# Patient Record
Sex: FEMALE | Race: WHITE | NOT HISPANIC OR LATINO | URBAN - METROPOLITAN AREA
[De-identification: names, ages, dates, MRNs, and addresses within clinical notes are randomized per-mention and may not be internally consistent; named-entity substitution may affect disease eponyms.]

---

## 2018-06-06 LAB
BILIRUB SERPL-MCNC: NEGATIVE MG/DL
BLOOD URINE, POC: NEGATIVE
CLARITY, POC UA: CLEAR
COLOR, POC UA: YELLOW
GLUCOSE UR QL STRIP: NEGATIVE
KETONES UR QL STRIP: NEGATIVE
LEUKOCYTE EST, POC UA: NEGATIVE
NITRITE, POC UA: NEGATIVE
PH, POC UA: 6
PROTEIN, POC: NEGATIVE
SPECIFIC GRAVITY, POC UA: 1.02
UROBILINOGEN, POC UA: NORMAL

## 2022-04-11 ENCOUNTER — HISTORICAL (OUTPATIENT)
Dept: ADMINISTRATIVE | Facility: HOSPITAL | Age: 42
End: 2022-04-11

## 2022-04-26 VITALS
WEIGHT: 151.88 LBS | BODY MASS INDEX: 26.91 KG/M2 | DIASTOLIC BLOOD PRESSURE: 81 MMHG | OXYGEN SATURATION: 99 % | HEIGHT: 63 IN | SYSTOLIC BLOOD PRESSURE: 131 MMHG

## 2022-09-22 ENCOUNTER — HISTORICAL (OUTPATIENT)
Dept: ADMINISTRATIVE | Facility: HOSPITAL | Age: 42
End: 2022-09-22

## 2023-01-14 ENCOUNTER — HOSPITAL ENCOUNTER (EMERGENCY)
Facility: HOSPITAL | Age: 43
Discharge: HOME | End: 2023-01-15
Attending: EMERGENCY MEDICINE
Payer: MEDICAID

## 2023-01-14 DIAGNOSIS — M79.672 FOOT PAIN, LEFT: ICD-10-CM

## 2023-01-14 DIAGNOSIS — S90.32XA CONTUSION OF LEFT FOOT, INITIAL ENCOUNTER: Primary | ICD-10-CM

## 2023-01-14 PROCEDURE — 99283 EMERGENCY DEPT VISIT LOW MDM: CPT | Mod: 25

## 2023-01-14 NOTE — Clinical Note
"Gail Delgadoson" Harjit was seen and treated in our emergency department on 1/14/2023.  She may return to work on 01/18/2023.       If you have any questions or concerns, please don't hesitate to call.      Yadi Hernandez MD"

## 2023-01-15 VITALS
RESPIRATION RATE: 16 BRPM | DIASTOLIC BLOOD PRESSURE: 85 MMHG | HEIGHT: 63 IN | WEIGHT: 170 LBS | BODY MASS INDEX: 30.12 KG/M2 | SYSTOLIC BLOOD PRESSURE: 117 MMHG | OXYGEN SATURATION: 100 % | HEART RATE: 94 BPM | TEMPERATURE: 98 F

## 2023-01-15 PROCEDURE — 25000003 PHARM REV CODE 250: Performed by: EMERGENCY MEDICINE

## 2023-01-15 RX ORDER — HYDROCODONE BITARTRATE AND ACETAMINOPHEN 5; 325 MG/1; MG/1
1 TABLET ORAL EVERY 6 HOURS PRN
Qty: 12 TABLET | Refills: 0 | Status: SHIPPED | OUTPATIENT
Start: 2023-01-15

## 2023-01-15 RX ORDER — HYDROCODONE BITARTRATE AND ACETAMINOPHEN 5; 325 MG/1; MG/1
1 TABLET ORAL
Status: COMPLETED | OUTPATIENT
Start: 2023-01-15 | End: 2023-01-15

## 2023-01-15 RX ADMIN — HYDROCODONE BITARTRATE AND ACETAMINOPHEN 1 TABLET: 5; 325 TABLET ORAL at 12:01
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
## 2025-07-22 ENCOUNTER — HOSPITAL ENCOUNTER (OUTPATIENT)
Dept: RADIOLOGY | Facility: HOSPITAL | Age: 45
Discharge: HOME | End: 2025-07-22
Attending: UROLOGY
Payer: COMMERCIAL

## 2025-07-22 ENCOUNTER — ANESTHESIA EVENT (OUTPATIENT)
Dept: SURGERY | Facility: HOSPITAL | Age: 45
End: 2025-07-22
Payer: COMMERCIAL

## 2025-07-22 DIAGNOSIS — N32.81 OVERACTIVE BLADDER: ICD-10-CM

## 2025-07-22 PROCEDURE — 71046 X-RAY EXAM CHEST 2 VIEWS: CPT | Mod: TC

## 2025-07-28 NOTE — PRE-PROCEDURE INSTRUCTIONS
"[facility]: Outpatient Surgery  Preprocedure Check-In Instructions     Your arrival time for your surgery or procedure is __1030am____.      Expectations: "Because of inconsistent procedure completion times, an unexpected wait may occur. The Physicians would like you to be here to prepare in the event they run ahead of time. We will make you as comfortable as possible and keep you informed. We apologize in advance if this happens."    You will arrive at [facility], [address] [place], [place].  Enter through the [address] entrance next to the Emergency Room, and come to the 6th floor to the Outpatient Surgery Department.     Visitory Policy:  You are allowed 2 adult visitors to be with you in the hospital. All hospital visitors should be in good current health.  No small children. No switching out of visitors while you are waiting to go to surgery, and while you are in surgery.      What to Bring:  Please have your ID, insurance cards, and all home medication bottles with you at check in.  Bring your CPAP machine if one is used at home.     Fasting:  Nothing to eat after midnight the night before your procedure. This includes no gum and/or tobacco products. You may have clear liquids limited to only: WATER, GATORADE, POWERADE and children can also have PEDIALYTE AND/OR APPLE JUICE , up until 2 hours before your arrival time.   Follow your doctor's instructions for taking any medications on the morning of your procedure.  If no instructions for taking medications were given, do not take any medications but bring your medications in their bottles to your procedure check in.     Follow your doctor's preoperative instructions regarding skin prep, bowel prep, bathing, or showering prior to your procedure.  If any special soaps were provided to you, please use according to your doctor's instructions. If no instructions were given from your doctor, take a good bath or shower with " antibacterial soap the night before and the morning of your procedure.  On the morning of procedure, wear loose, comfortable clothing.  No lotions, makeup, perfumes, colognes, deodorant, or jewelry to your procedure.  Removable items (glasses, contact lenses, dentures, retainers, hearing aids) need to be removed for your procedure.  Bring your storage containers for these items if you wear them.     Artificial nails, body jewelry, eyelash extensions, and/or hair extensions with metal clips are not allowed during your surgery.  If you currently wear any of these items, please arrange for them to be removed prior to your arrival to the hospital.     Outpatient or Same Day Surgeries:  Any patients receiving sedation/anesthesia are advised not to drive for 24 hours after their procedure.  We do not allow patients to drive themselves home after discharge.  If you are going home after your procedure, please have someone available to drive you home from the hospital.        You may call the Outpatient Surgery Department at [phone] with any questions or concerns.  We are looking forward to meeting you and taking great care of you for your procedure.  Thank you for choosing [facility] for your surgical needs

## 2025-07-29 ENCOUNTER — ANESTHESIA (OUTPATIENT)
Dept: SURGERY | Facility: HOSPITAL | Age: 45
End: 2025-07-29
Payer: COMMERCIAL

## 2025-07-29 ENCOUNTER — HOSPITAL ENCOUNTER (OUTPATIENT)
Facility: HOSPITAL | Age: 45
Discharge: HOME | End: 2025-07-29
Attending: UROLOGY | Admitting: UROLOGY
Payer: COMMERCIAL

## 2025-07-29 VITALS
WEIGHT: 167.56 LBS | RESPIRATION RATE: 19 BRPM | OXYGEN SATURATION: 98 % | SYSTOLIC BLOOD PRESSURE: 107 MMHG | BODY MASS INDEX: 29.69 KG/M2 | HEART RATE: 81 BPM | HEIGHT: 63 IN | DIASTOLIC BLOOD PRESSURE: 74 MMHG | TEMPERATURE: 98 F

## 2025-07-29 DIAGNOSIS — N39.3 STRESS INCONTINENCE OF URINE: Primary | ICD-10-CM

## 2025-07-29 PROBLEM — R32 URINARY INCONTINENCE: Status: ACTIVE | Noted: 2025-07-29

## 2025-07-29 PROBLEM — R32 URINARY INCONTINENCE: Status: RESOLVED | Noted: 2025-07-29 | Resolved: 2025-07-29

## 2025-07-29 PROCEDURE — 71000039 HC RECOVERY, EACH ADD'L HOUR: Performed by: UROLOGY

## 2025-07-29 PROCEDURE — 36000706: Performed by: UROLOGY

## 2025-07-29 PROCEDURE — 63600175 PHARM REV CODE 636 W HCPCS: Performed by: ANESTHESIOLOGY

## 2025-07-29 PROCEDURE — 25000003 PHARM REV CODE 250: Performed by: UROLOGY

## 2025-07-29 PROCEDURE — 63600175 PHARM REV CODE 636 W HCPCS: Performed by: STUDENT IN AN ORGANIZED HEALTH CARE EDUCATION/TRAINING PROGRAM

## 2025-07-29 PROCEDURE — 63600175 PHARM REV CODE 636 W HCPCS

## 2025-07-29 PROCEDURE — 37000009 HC ANESTHESIA EA ADD 15 MINS: Performed by: UROLOGY

## 2025-07-29 PROCEDURE — 71000015 HC POSTOP RECOV 1ST HR: Performed by: UROLOGY

## 2025-07-29 PROCEDURE — 36000707: Performed by: UROLOGY

## 2025-07-29 PROCEDURE — 25000003 PHARM REV CODE 250: Performed by: STUDENT IN AN ORGANIZED HEALTH CARE EDUCATION/TRAINING PROGRAM

## 2025-07-29 PROCEDURE — 71000016 HC POSTOP RECOV ADDL HR: Performed by: UROLOGY

## 2025-07-29 PROCEDURE — 37000008 HC ANESTHESIA 1ST 15 MINUTES: Performed by: UROLOGY

## 2025-07-29 PROCEDURE — 63600175 PHARM REV CODE 636 W HCPCS: Performed by: UROLOGY

## 2025-07-29 PROCEDURE — 71000033 HC RECOVERY, INTIAL HOUR: Performed by: UROLOGY

## 2025-07-29 PROCEDURE — C1771 REP DEV, URINARY, W/SLING: HCPCS | Performed by: UROLOGY

## 2025-07-29 PROCEDURE — 25000003 PHARM REV CODE 250

## 2025-07-29 DEVICE — SUPRAPUBIC MID-URETHRAL SLING
Type: IMPLANTABLE DEVICE | Site: BLADDER | Status: FUNCTIONAL
Brand: LYNX™ SYSTEM

## 2025-07-29 RX ORDER — MIDAZOLAM HYDROCHLORIDE 2 MG/2ML
2 INJECTION, SOLUTION INTRAMUSCULAR; INTRAVENOUS ONCE AS NEEDED
Status: DISCONTINUED | OUTPATIENT
Start: 2025-07-29 | End: 2025-07-29 | Stop reason: HOSPADM

## 2025-07-29 RX ORDER — METHOCARBAMOL 100 MG/ML
1000 INJECTION, SOLUTION INTRAMUSCULAR; INTRAVENOUS ONCE AS NEEDED
Status: DISCONTINUED | OUTPATIENT
Start: 2025-07-29 | End: 2025-07-29 | Stop reason: HOSPADM

## 2025-07-29 RX ORDER — SODIUM CHLORIDE, SODIUM GLUCONATE, SODIUM ACETATE, POTASSIUM CHLORIDE AND MAGNESIUM CHLORIDE 30; 37; 368; 526; 502 MG/100ML; MG/100ML; MG/100ML; MG/100ML; MG/100ML
INJECTION, SOLUTION INTRAVENOUS CONTINUOUS
Status: DISCONTINUED | OUTPATIENT
Start: 2025-07-29 | End: 2025-07-29 | Stop reason: HOSPADM

## 2025-07-29 RX ORDER — DIPHENHYDRAMINE HYDROCHLORIDE 50 MG/ML
INJECTION, SOLUTION INTRAMUSCULAR; INTRAVENOUS
Status: DISCONTINUED | OUTPATIENT
Start: 2025-07-29 | End: 2025-07-29

## 2025-07-29 RX ORDER — SODIUM CHLORIDE 9 MG/ML
INJECTION, SOLUTION INTRAVENOUS CONTINUOUS
Status: DISCONTINUED | OUTPATIENT
Start: 2025-07-29 | End: 2025-07-29 | Stop reason: HOSPADM

## 2025-07-29 RX ORDER — PROCHLORPERAZINE EDISYLATE 5 MG/ML
5 INJECTION INTRAMUSCULAR; INTRAVENOUS EVERY 30 MIN PRN
Status: DISCONTINUED | OUTPATIENT
Start: 2025-07-29 | End: 2025-07-29 | Stop reason: HOSPADM

## 2025-07-29 RX ORDER — ONDANSETRON HYDROCHLORIDE 2 MG/ML
INJECTION, SOLUTION INTRAVENOUS
Status: DISCONTINUED | OUTPATIENT
Start: 2025-07-29 | End: 2025-07-29

## 2025-07-29 RX ORDER — FENTANYL CITRATE 50 UG/ML
INJECTION, SOLUTION INTRAMUSCULAR; INTRAVENOUS
Status: DISCONTINUED | OUTPATIENT
Start: 2025-07-29 | End: 2025-07-29

## 2025-07-29 RX ORDER — LIDOCAINE HYDROCHLORIDE 20 MG/ML
INJECTION, SOLUTION EPIDURAL; INFILTRATION; INTRACAUDAL; PERINEURAL
Status: DISCONTINUED | OUTPATIENT
Start: 2025-07-29 | End: 2025-07-29

## 2025-07-29 RX ORDER — DROPERIDOL 2.5 MG/ML
0.62 INJECTION, SOLUTION INTRAMUSCULAR; INTRAVENOUS ONCE AS NEEDED
Status: DISCONTINUED | OUTPATIENT
Start: 2025-07-29 | End: 2025-07-29 | Stop reason: HOSPADM

## 2025-07-29 RX ORDER — LIDOCAINE HYDROCHLORIDE AND EPINEPHRINE 10; 10 UG/ML; MG/ML
INJECTION, SOLUTION INFILTRATION; PERINEURAL
Status: DISCONTINUED | OUTPATIENT
Start: 2025-07-29 | End: 2025-07-29 | Stop reason: HOSPADM

## 2025-07-29 RX ORDER — DEXAMETHASONE SODIUM PHOSPHATE 4 MG/ML
INJECTION, SOLUTION INTRA-ARTICULAR; INTRALESIONAL; INTRAMUSCULAR; INTRAVENOUS; SOFT TISSUE
Status: DISCONTINUED | OUTPATIENT
Start: 2025-07-29 | End: 2025-07-29

## 2025-07-29 RX ORDER — MIDAZOLAM HYDROCHLORIDE 1 MG/ML
INJECTION INTRAMUSCULAR; INTRAVENOUS
Status: DISCONTINUED | OUTPATIENT
Start: 2025-07-29 | End: 2025-07-29

## 2025-07-29 RX ORDER — ONDANSETRON HYDROCHLORIDE 2 MG/ML
INJECTION, SOLUTION INTRAVENOUS
Status: COMPLETED
Start: 2025-07-29 | End: 2025-07-29

## 2025-07-29 RX ORDER — ACETAMINOPHEN 10 MG/ML
INJECTION, SOLUTION INTRAVENOUS
Status: DISCONTINUED | OUTPATIENT
Start: 2025-07-29 | End: 2025-07-29

## 2025-07-29 RX ORDER — MEPERIDINE HYDROCHLORIDE 25 MG/ML
12.5 INJECTION INTRAMUSCULAR; INTRAVENOUS; SUBCUTANEOUS ONCE AS NEEDED
Status: DISCONTINUED | OUTPATIENT
Start: 2025-07-29 | End: 2025-07-29 | Stop reason: HOSPADM

## 2025-07-29 RX ORDER — SODIUM CHLORIDE, SODIUM LACTATE, POTASSIUM CHLORIDE, CALCIUM CHLORIDE 600; 310; 30; 20 MG/100ML; MG/100ML; MG/100ML; MG/100ML
INJECTION, SOLUTION INTRAVENOUS CONTINUOUS
Status: DISCONTINUED | OUTPATIENT
Start: 2025-07-29 | End: 2025-07-29 | Stop reason: HOSPADM

## 2025-07-29 RX ORDER — HYDROMORPHONE HYDROCHLORIDE 2 MG/ML
0.4 INJECTION, SOLUTION INTRAMUSCULAR; INTRAVENOUS; SUBCUTANEOUS EVERY 5 MIN PRN
Status: DISCONTINUED | OUTPATIENT
Start: 2025-07-29 | End: 2025-07-29 | Stop reason: HOSPADM

## 2025-07-29 RX ORDER — ROCURONIUM BROMIDE 10 MG/ML
INJECTION, SOLUTION INTRAVENOUS
Status: DISCONTINUED | OUTPATIENT
Start: 2025-07-29 | End: 2025-07-29

## 2025-07-29 RX ORDER — PROPOFOL 10 MG/ML
VIAL (ML) INTRAVENOUS
Status: DISCONTINUED | OUTPATIENT
Start: 2025-07-29 | End: 2025-07-29

## 2025-07-29 RX ORDER — CEFAZOLIN SODIUM 1 G/3ML
INJECTION, POWDER, FOR SOLUTION INTRAMUSCULAR; INTRAVENOUS
Status: DISCONTINUED | OUTPATIENT
Start: 2025-07-29 | End: 2025-07-29

## 2025-07-29 RX ORDER — KETOROLAC TROMETHAMINE 30 MG/ML
INJECTION, SOLUTION INTRAMUSCULAR; INTRAVENOUS
Status: DISCONTINUED | OUTPATIENT
Start: 2025-07-29 | End: 2025-07-29

## 2025-07-29 RX ORDER — DIPHENHYDRAMINE HYDROCHLORIDE 50 MG/ML
25 INJECTION, SOLUTION INTRAMUSCULAR; INTRAVENOUS ONCE
Status: DISCONTINUED | OUTPATIENT
Start: 2025-07-29 | End: 2025-07-29 | Stop reason: HOSPADM

## 2025-07-29 RX ORDER — ONDANSETRON HYDROCHLORIDE 2 MG/ML
4 INJECTION, SOLUTION INTRAVENOUS ONCE
Status: COMPLETED | OUTPATIENT
Start: 2025-07-29 | End: 2025-07-29

## 2025-07-29 RX ORDER — CEFAZOLIN SODIUM 2 G/50ML
2 SOLUTION INTRAVENOUS
Status: DISCONTINUED | OUTPATIENT
Start: 2025-07-29 | End: 2025-07-29 | Stop reason: HOSPADM

## 2025-07-29 RX ADMIN — ONDANSETRON 4 MG: 2 INJECTION INTRAMUSCULAR; INTRAVENOUS at 03:07

## 2025-07-29 RX ADMIN — CEFAZOLIN 2 G: 330 INJECTION, POWDER, FOR SOLUTION INTRAMUSCULAR; INTRAVENOUS at 01:07

## 2025-07-29 RX ADMIN — FENTANYL CITRATE 50 MCG: 50 INJECTION, SOLUTION INTRAMUSCULAR; INTRAVENOUS at 01:07

## 2025-07-29 RX ADMIN — MIDAZOLAM HYDROCHLORIDE 2 MG: 1 INJECTION, SOLUTION INTRAMUSCULAR; INTRAVENOUS at 12:07

## 2025-07-29 RX ADMIN — PROPOFOL 150 MG: 10 INJECTION, EMULSION INTRAVENOUS at 12:07

## 2025-07-29 RX ADMIN — SODIUM CHLORIDE, SODIUM GLUCONATE, SODIUM ACETATE, POTASSIUM CHLORIDE AND MAGNESIUM CHLORIDE: 526; 502; 368; 37; 30 INJECTION, SOLUTION INTRAVENOUS at 12:07

## 2025-07-29 RX ADMIN — ROCURONIUM BROMIDE 20 MG: 10 SOLUTION INTRAVENOUS at 01:07

## 2025-07-29 RX ADMIN — HYDROMORPHONE HYDROCHLORIDE 0.4 MG: 2 INJECTION, SOLUTION INTRAMUSCULAR; INTRAVENOUS; SUBCUTANEOUS at 03:07

## 2025-07-29 RX ADMIN — ROCURONIUM BROMIDE 60 MG: 10 SOLUTION INTRAVENOUS at 12:07

## 2025-07-29 RX ADMIN — PROCHLORPERAZINE EDISYLATE 5 MG: 5 INJECTION INTRAMUSCULAR; INTRAVENOUS at 03:07

## 2025-07-29 RX ADMIN — KETOROLAC TROMETHAMINE 15 MG: 30 INJECTION, SOLUTION INTRAMUSCULAR; INTRAVENOUS at 02:07

## 2025-07-29 RX ADMIN — ONDANSETRON 4 MG: 2 INJECTION INTRAMUSCULAR; INTRAVENOUS at 01:07

## 2025-07-29 RX ADMIN — ONDANSETRON HYDROCHLORIDE 4 MG: 2 INJECTION, SOLUTION INTRAVENOUS at 03:07

## 2025-07-29 RX ADMIN — LIDOCAINE HYDROCHLORIDE 80 MG: 20 INJECTION, SOLUTION EPIDURAL; INFILTRATION; INTRACAUDAL; PERINEURAL at 12:07

## 2025-07-29 RX ADMIN — SUGAMMADEX 200 MG: 100 INJECTION, SOLUTION INTRAVENOUS at 02:07

## 2025-07-29 RX ADMIN — DEXAMETHASONE SODIUM PHOSPHATE 8 MG: 4 INJECTION, SOLUTION INTRA-ARTICULAR; INTRALESIONAL; INTRAMUSCULAR; INTRAVENOUS; SOFT TISSUE at 01:07

## 2025-07-29 RX ADMIN — DIPHENHYDRAMINE HYDROCHLORIDE 12.5 MG: 50 INJECTION INTRAMUSCULAR; INTRAVENOUS at 01:07

## 2025-07-29 RX ADMIN — ACETAMINOPHEN 1000 MG: 10 INJECTION, SOLUTION INTRAVENOUS at 01:07

## 2025-07-29 RX ADMIN — FENTANYL CITRATE 50 MCG: 50 INJECTION, SOLUTION INTRAMUSCULAR; INTRAVENOUS at 12:07

## 2025-07-29 NOTE — ANESTHESIA PREPROCEDURE EVALUATION
"                                                                                                             07/29/2025  Gail Lombardi is a 45 y.o., female.  Pre-operative evaluation for Procedure(s) (LRB):  SLING, MIDURETHRAL (N/A)    NPO  Date of last solid: 07/28/25  Time of last solid: 2100  Date of last liquid: 07/29/25  Time of last liquid: 2100    /88   Pulse 96   Temp 36.9 °C (98.4 °F) (Oral)   Resp 16   Ht 5' 3" (1.6 m)   Wt 76 kg (167 lb 8.8 oz)   SpO2 99%   Breastfeeding No   BMI 29.68 kg/m²     Past Medical History:   Diagnosis Date    Anesthesia complication     denies       Problem List[1]    Review of patient's allergies indicates:  No Known Allergies    Current Outpatient Medications   Medication Instructions    HYDROcodone-acetaminophen (NORCO) 5-325 mg per tablet 1 tablet, Oral, Every 6 hours PRN       Past Surgical History:   Procedure Laterality Date    BILATERAL TUBAL LIGATION      HYSTERECTOMY           Lab Results   Component Value Date    WBC 5.15 07/22/2025    HGB 14.0 07/22/2025    HCT 43.0 07/22/2025    MCV 89.6 07/22/2025     07/22/2025          BMP  Lab Results   Component Value Date     07/22/2025    K 4.4 07/22/2025    CO2 28 07/22/2025    BUN 15.2 07/22/2025    CREATININE 0.76 07/22/2025    CALCIUM 9.1 07/22/2025        INR  No results for input(s): "PT", "INR", "PROTIME", "APTT" in the last 72 hours.    No results found for: "PREGTESTUR", "PREGSERUM", "HCG", "HCGQUANT"        EKG:  Results for orders placed or performed in visit on 07/22/25   EKG 12-lead    Collection Time: 07/22/25  9:56 AM   Result Value Ref Range    QRS Duration 64 ms    [facility] QTC Calculation 430 ms    Narrative    Test Reason : N32.81,    Vent. Rate :  88 BPM     Atrial Rate :  88 BPM     P-R Int : 144 ms          QRS Dur :  64 ms      QT Int : 356 ms       P-R-T Axes :  47 -18  22 degrees    QTcB Int : 430 ms    Normal sinus rhythm  late transition  Abnormal ECG  No previous ECGs " available  Confirmed by Maxwell Ferrera ([id number]) on 7/22/2025 6:02:23 PM    Referred By: ANGELIA LAMA           Confirmed By: Maxwell Ferrera       No results found for this or any previous visit.            Pre-op Assessment          Review of Systems      Physical Exam  General: Alert and Oriented    Airway:  Mallampati: III   Mouth Opening: Normal  TM Distance: Normal  Tongue: Normal  Neck ROM: Normal ROM    Dental:  Intact, Partial Dentures    Chest/Lungs:  Clear to auscultation, Normal Respiratory Rate    Heart:  Rate: Normal  Rhythm: Regular Rhythm        Anesthesia Plan  Type of Anesthesia, risks & benefits discussed:    Anesthesia Type: Gen ETT  Intra-op Monitoring Plan: Standard ASA Monitors  Post Op Pain Control Plan: multimodal analgesia  Induction:  IV and Inhalation  Airway Plan: Direct, Post-Induction  Informed Consent: Informed consent signed with the Patient and all parties understand the risks and agree with anesthesia plan.  All questions answered.   ASA Score: 2  Day of Surgery Review of History & Physical: H&P Update referred to the surgeon/provider.  Anesthesia Plan Notes: Discussed Anesthetic Plan w/ Pt/Family. Questions Entertained. Accepted.    Ready For Surgery From Anesthesia Perspective.     .           [1] There is no problem list on file for this patient.

## 2025-07-29 NOTE — TRANSFER OF CARE
"Anesthesia Transfer of Care Note    Patient: Gail Lombardi    Procedure(s) Performed: Procedure(s) (LRB):  SLING, MIDURETHRAL (N/A)    Patient location: PACU    Anesthesia Type: general    Transport from OR: Transported from OR on room air with adequate spontaneous ventilation    Post pain: adequate analgesia    Post assessment: no apparent anesthetic complications    Post vital signs: stable    Level of consciousness: sedated    Nausea/Vomiting: no nausea/vomiting    Complications: none    Transfer of care protocol was followed      Last vitals: Visit Vitals  /78   Pulse 76   Temp 36.6 °C (97.9 °F) (Temporal)   Resp 15   Ht 5' 3" (1.6 m)   Wt 76 kg (167 lb 8.8 oz)   LMP 12/30/2022   SpO2 100%   Breastfeeding No   BMI 29.68 kg/m²     "

## 2025-07-29 NOTE — ANESTHESIA PROCEDURE NOTES
Intubation    Date/Time: 7/29/2025 1:00 PM    Performed by: Aurora Ward CRNA  Authorized by: Emilio Mcmullen MD    Intubation:     Induction:  Intravenous    Intubated:  Postinduction    Mask Ventilation:  Easy mask    Attempts:  1    Attempted By:  Student    Method of Intubation:  Direct    Blade:  Smith 3    Laryngeal View Grade: Grade I - full view of cords      Difficult Airway Encountered?: No      Complications:  None    Airway Device:  Oral endotracheal tube    Airway Device Size:  7.0    Style/Cuff Inflation:  Cuffed (inflated to minimal occlusive pressure)    Inflation Amount (mL):  6    Tube secured:  21    Secured at:  The lips    Placement Verified By:  Capnometry    Complicating Factors:  None    Findings Post-Intubation:  BS equal bilateral and atraumatic/condition of teeth unchanged

## 2025-07-29 NOTE — DISCHARGE SUMMARY
[facility] Periop Services  Discharge Note  Short Stay    Procedure(s) (LRB):  SLING, MIDURETHRAL (N/A)      OUTCOME: Patient tolerated treatment/procedure well without complication and is now ready for discharge.    DISPOSITION: Home or Self Care    FINAL DIAGNOSIS:  Urinary incontinence    FOLLOWUP: In clinic    DISCHARGE INSTRUCTIONS:    Discharge Procedure Orders   Diet Adult Regular     Lifting restrictions     Notify your health care provider if you experience any of the following:  temperature >100.4     Notify your health care provider if you experience any of the following:  persistent nausea and vomiting or diarrhea     Notify your health care provider if you experience any of the following:  severe uncontrolled pain        TIME SPENT ON DISCHARGE: 15 minutes

## 2025-07-29 NOTE — OP NOTE
SURGEON:  Harrison Krueger MD    POSTOPERATIVE DIAGNOSIS(ES):  Stress urinary continence, female.    PREOPERATIVE DIAGNOSIS(ES):  Stress urinary continence, female.    PROCEDURE(S)/OPERATION(S) PERFORMED:  1. Retropubic mid urethral sling    ANESTHESIA:  General.    COMPLICATIONS:  None.    FINDINGS:  45 y.o. Female for stress urinary incontinence. Mid urethral sling placement was performed. Pt has normal anatomy.    ESTIMATED BLOOD LOSS:  25ml    COMPLICATIONS:  None.    SUMMARY:  The patient was carefully positioned in the low dorsal lithotomy position,  prepped and draped in standard sterile manner. Chlorhexidine and Betadine  scrub and paint were utilized.      Next, a garcia catheter was placed into the bladder the and a weighted speculum was placed into the vagina.  Chromic sutures were placed in the labia and tied to the inner thigh for retraction. The mid urethra was marked and xylocaine with epi was used for hydrodisection.  The vaginal epithelium was incised and dissected off the periurethral fascia with tenotomy scissors. The dissections were  carried out laterally up towards the retropubic space. The retropubic space was freed up for passage of the sling.    Puncture incision were made in the suprapubically.  A both trocars were placed in antegrade  manner, first on the patient's left, then on the right side.    Cystourethroscopy was performed and there was no injury to the urethra or  bladder.    The sling suture was then attached to the trocar and brought out the abdominal incisions. Next, the sling was placed in a tension free fashion. located in the mid urethra.    There was no ongoing bleeding, and the patient's wound had been copiously irrigated.  The abdominal incisions was closed with interrupted 4-0 monocyrl suture with dermabond dressing.  The vaginal incision was closed with  interrupted 2-0 Polysorb suture. A vaginal pack was placed. The Garcia catheter drained clear urine. It should be noted  that prior to vaginal  epithelium closure.     Plan:  Observation with Vag pack and jose wynn in the am.    07/29/2025  2:43 PM

## 2025-07-29 NOTE — DISCHARGE INSTRUCTIONS
NO driving and NO alcohol consumption for 24 hours.     Remove vaginal packing and garcia catheter in the morning (7/30/2025).    Keep sites CLEAN AND DRY for 48 hours. OK to shower afterwards. Do NOT submerge incisions under water. Do not apply lotions, creams, or ointments to surgical site.     NO heavy lifting. DO NOT lift objects greater than 10 lbs. Your doctor will advise at your follow up appointment when to resume normal activity.     Monitor for infection such as fever or chills.     Drink 6 to 8 glasses of water a day and 3 to 4 glasses in the first few hours after the procedure to flush out your bladder and reduce irritation.    You may see some blood in your urine for a few days. This is normal.    Report to your nearest ER and/or notify your provider if you experience any trouble urinating, uncontrollable pain, abdominal pain, visible dime size clots in urine.     BLEEDING: if you experience uncontrollable bleeding, contact your doctor or go to ER.    NAUSEA: due to the anesthesia, you may experience nausea for up to 24 hours. If nausea and vomiting last longer, contact your doctor.     INFECTION:  watch for any signs or symptoms of infection such as chills, fever, or foul smelling urine. Notify your doctor.     PAIN : take your pain medications as directed. If the pain medications are not helping, notify your doctor.

## 2025-07-30 NOTE — ANESTHESIA POSTPROCEDURE EVALUATION
Anesthesia Post Evaluation    Patient: Gail Lombardi    Procedure(s) Performed: Procedure(s) (LRB):  SLING, MIDURETHRAL (N/A)    Final Anesthesia Type: general      Patient location during evaluation: PACU  Patient participation: Yes- Able to Participate  Level of consciousness: awake  Post-procedure vital signs: reviewed and stable  Pain management: adequate  Airway patency: patent    PONV status at discharge: vomiting (controlled) and nausea (controlled)  Anesthetic complications: no      Cardiovascular status: hemodynamically stable  Respiratory status: spontaneous ventilation and unassisted  Hydration status: euvolemic  Follow-up not needed.  Comments:                  Vitals Value Taken Time   /74 07/29/25 17:15   Temp 36.7 °C (98 °F) 07/29/25 15:36   Pulse 81 07/29/25 17:15   Resp 19 07/29/25 17:15   SpO2 98 % 07/29/25 17:15         Event Time   Out of Recovery 15:30:00         Pain/Elvin Score: Pain Rating Prior to Med Admin: 7 (7/29/2025  3:25 PM)  Elvin Score: 10 (7/29/2025  5:15 PM)  Modified Elvin Score: 19 (7/29/2025  5:15 PM)

## 2025-08-01 ENCOUNTER — NURSE TRIAGE (OUTPATIENT)
Dept: ADMINISTRATIVE | Facility: CLINIC | Age: 45
End: 2025-08-01
Payer: COMMERCIAL

## 2025-08-01 NOTE — TELEPHONE ENCOUNTER
Pt called in stating she had surgery on Tues. States thinks she is urinating out of vagina. States had stress incontinence prior to surgery. Had sling placed. Unsure if this is different. States prior to surgery thought she was urinating out of vagina but testing revealed she was not. Pt states now she feels it coming from her vagina. No dysuria. No fever. Still having frequent urination. Still with incontinence. No odor to urine. Urine normal color. Asking if this is expected or if she needs to seek care urgently. Advised will reach out to OCP and will call back with care directives.     Dr. Kelley, OCP for Dr. Krueger called. Not emergency but not expected result. Call clinic Monday for appt.   Pt called back. Advised per Dr. Kelley. Advised to call Monday for appt. Also advised NT will route to provider asking for outreach with appt/ care directives until appt. Pt has no additional concerns or questions at this time. Advised to call back with concerns or worsening symptoms. Verbalized understanding.     Reason for Disposition   Caller has URGENT question and triager unable to answer question    Additional Information   Negative: Sounds like a life-threatening emergency to the triager   Negative: Bright red, wide-spread, sunburn-like rash   Negative: SEVERE headache (e.g., excruciating) and after spinal (epidural) anesthesia   Negative: Vomiting and persists > 4 hours   Negative: Vomiting and abdomen looks much more swollen than usual   Negative: Drinking very little and dehydration suspected (e.g., no urine > 12 hours, very dry mouth, very lightheaded)   Negative: Patient sounds very sick or weak to the triager   Negative: Sounds like a serious complication to the triager   Negative: Fever > 100.4 F (38.0 C)    Protocols used: Post-Op Symptoms and [id number]

## (undated) DEVICE — SUT 0 27IN CHROMIC GUT CT-1

## (undated) DEVICE — DRAPE LEGGINGS CUFF 31X48IN

## (undated) DEVICE — SYR 30CC LUER LOCK

## (undated) DEVICE — DRAPE SURGICAL STERI IRRG PCH

## (undated) DEVICE — JELLY SURGILUBE LUBE TUBE 2OZ

## (undated) DEVICE — PAD CURITY MATERNITY PERI

## (undated) DEVICE — BAG DRAIN UROLOGY AND HOSE

## (undated) DEVICE — SUT MCRYL PLUS 4-0 PS2 27IN

## (undated) DEVICE — KIT SURGICAL TURNOVER

## (undated) DEVICE — TRAY SKIN SCRUB WET PREMIUM

## (undated) DEVICE — GLOVE PROTEXIS HYDROGEL SZ7.5

## (undated) DEVICE — ELECTRODE PATIENT RETURN DISP

## (undated) DEVICE — ADAPTER STOPCOCK FEMALE LL

## (undated) DEVICE — SET CYSTO IRR DRP CHMBR 84IN

## (undated) DEVICE — SUT 2-0 VICRYL / CT-1

## (undated) DEVICE — CATH 16FR URETHRL RED RUB

## (undated) DEVICE — TUBE SUCTION MEDI-VAC STERILE

## (undated) DEVICE — Device

## (undated) DEVICE — SUT 2/0 30IN SILK BLK BRAI

## (undated) DEVICE — ADHESIVE DERMABOND ADVANCED

## (undated) DEVICE — APPLICATOR CHLORAPREP ORN 26ML

## (undated) DEVICE — SOL NACL IRR 1000ML BTL

## (undated) DEVICE — TRAY CATH 1-LYR URIMTR 16FR